# Patient Record
Sex: MALE
[De-identification: names, ages, dates, MRNs, and addresses within clinical notes are randomized per-mention and may not be internally consistent; named-entity substitution may affect disease eponyms.]

---

## 2019-12-24 PROBLEM — Z00.00 ENCOUNTER FOR PREVENTIVE HEALTH EXAMINATION: Status: ACTIVE | Noted: 2019-12-24

## 2019-12-27 ENCOUNTER — APPOINTMENT (OUTPATIENT)
Dept: NEUROSURGERY | Facility: CLINIC | Age: 59
End: 2019-12-27
Payer: MEDICAID

## 2019-12-27 VITALS — WEIGHT: 147 LBS | HEIGHT: 70.08 IN | BODY MASS INDEX: 21.05 KG/M2

## 2019-12-27 DIAGNOSIS — M47.816 SPONDYLOSIS W/OUT MYELOPATHY OR RADICULOPATHY, LUMBAR REGION: ICD-10-CM

## 2019-12-27 DIAGNOSIS — R20.2 PARESTHESIA OF SKIN: ICD-10-CM

## 2019-12-27 PROCEDURE — 99203 OFFICE O/P NEW LOW 30 MIN: CPT

## 2019-12-27 NOTE — ASSESSMENT
[FreeTextEntry1] : 59 year old gentleman who i saw last about 5 years ago for paresthesia in the left more than the right foot. This has progressed to a point where it affects his standing quite a bit especially since he is a . He has no low back or lower extremity pain at all. He has not been taking any medication for the paresthesia. He smokes more than one pack of cigarettes for many years. He is not a diabetic pt.\par \par On exam, he has intact motor and sensory functions. His gait is steady. SLR are negative bilaterally. ROM of the low back is good. He pointed to the medial aspect of the left foot being the most numb area. \par \par Report of a recent MRI of the lumbar spine suggested spondylotic diseases but no neuro compressive lesions .He reportedly also had undergone a EMG of the lower extremities a few months ago although I have no report to review today. He described it as normal however. \par \par I would like to refer him to see a neurologist to investigate and treat his paresthesia of his feet. I did give him a Rx of Gabapentin and Vitamin B6 for the time being. He understood our discussion well.

## 2019-12-30 RX ORDER — GABAPENTIN 100 MG/1
100 CAPSULE ORAL EVERY 8 HOURS
Qty: 180 | Refills: 2 | Status: ACTIVE | COMMUNITY
Start: 2019-12-30 | End: 1900-01-01